# Patient Record
Sex: MALE | Race: WHITE | NOT HISPANIC OR LATINO | URBAN - METROPOLITAN AREA
[De-identification: names, ages, dates, MRNs, and addresses within clinical notes are randomized per-mention and may not be internally consistent; named-entity substitution may affect disease eponyms.]

---

## 2019-07-25 NOTE — PATIENT DISCUSSION
(H25.13) Age-related nuclear cataract, bilateral - Assesment : Examination revealed cataract. Pt is bothered and symptomatic. Pt states only wants to have OD done. - Plan : Recommended cataract extraction to improve visual function. Discussed procedure, risks, and benefits. All questions answered. Pt wishes to proceed. Schedule A-scan and consult.

## 2019-07-25 NOTE — PATIENT DISCUSSION
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 - Plan : Monitor.

## 2019-07-25 NOTE — PATIENT DISCUSSION
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 - Plan : Monitor. Explained increased air exposure to OS and recommended regular use of Gel ATs and kennedi qhs to keep OS lubricated.

## 2019-08-08 NOTE — PATIENT DISCUSSION
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 - Plan : Recommend retinal evaluation prior to considering cataract surgery.

## 2019-08-08 NOTE — PATIENT DISCUSSION
(Z29.152) Retinal artery branch occlusion, left eye - Assesment : Hx of a branch retinal artery occlusion OS. - Plan : Monitor.

## 2019-08-08 NOTE — PATIENT DISCUSSION
(H25.13) Age-related nuclear cataract, bilateral - Assesment : Examination revealed cataract. Pt is bothered and symptomatic. Pt states only wants to have OD done. Recommend retinal clearance prior to cataract surgery to check blood flow OD. - Plan : Risks, Benefits and Alternatives were discussed with patient at length for Cataract Surgery. Visual symptoms are consistent with Cataract findings on examination and current refraction no longer provides satisfactory vision. Discussed with patient limitations with vision that can be associated with poor retinal circulation. Discussed that visual outcome may be limited due to retina. Significant astigmatism OU and recommend Toric. Discussed that if astigmatism is not corrected patient will need correction. Patient understands and desires surgery. All questions answered. Risks, Benefits and Alternatives discussed at length for IOL placement. Patient will need to wear glasses for. EYE: TBD IOL TYPE: TBD POST OPERATIVE TARGET:  RECOMMENDED PACKAGE:  PT PREFERRED PACKAGE:   Patient to see surgery counselor today.  RV 2-3 week follow up after appt with retina

## 2019-08-08 NOTE — PATIENT DISCUSSION
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 Superficial keratitis, Punctate erosion OS. - Plan : Continue gel drops 2-3 times daily OS and gel ointment QHS OS. Recommend Tranquileyes goggles to help keep eye comfortable at night. Monitor for changes. Advised patient to call our office with decreased vision or increased symptoms.

## 2021-08-19 NOTE — PATIENT DISCUSSION
"Pt refused further examination and started to walk out after the OCT. Discussed and emphasized serious condition of likely artery occlusion and that he could permanently lose vision and go blind in both eyes. He states his OS is ""shot"" anyway and ""he has more important issues than his vision right now."" Would not discuss GCA symptoms and states that ""he'll be dead in a couple of months."" I was able to convince him to come see our retinal specialist tomorrow but he refused referral to retinal specialist today as he has ""too much to do"". "

## 2021-08-20 NOTE — PATIENT DISCUSSION
Jon Retinal Artery Occlusion,   Follow up with PCP-check 2D Echo carotid doppler rule out emboli.  No scalp tenderness, no jaw pain, no evidence of GCA.  Patient refuses testing.

## 2021-10-26 ENCOUNTER — NEW PATIENT COMPREHENSIVE (OUTPATIENT)
Dept: URBAN - METROPOLITAN AREA CLINIC 50 | Facility: CLINIC | Age: 86
End: 2021-10-26

## 2021-10-26 DIAGNOSIS — H25.11: ICD-10-CM

## 2021-10-26 DIAGNOSIS — H18.523: ICD-10-CM

## 2021-10-26 PROCEDURE — 92015NC REFRACTION NO CHARGE

## 2021-10-26 PROCEDURE — 92004 COMPRE OPH EXAM NEW PT 1/>: CPT

## 2021-10-26 ASSESSMENT — KERATOMETRY
OS_AXISANGLE2_DEGREES: 57
OS_AXISANGLE_DEGREES: 147
OD_AXISANGLE2_DEGREES: 97
OD_K2POWER_DIOPTERS: 42.00
OS_K2POWER_DIOPTERS: 41.50
OD_K1POWER_DIOPTERS: 40.50
OD_AXISANGLE_DEGREES: 7
OS_K1POWER_DIOPTERS: 40.50

## 2021-10-26 ASSESSMENT — VISUAL ACUITY
OD_CC: 20/70
OS_GLARE: 20/100
OU_CC: J10
OS_SC: 20/100
OD_GLARE: 20/100
OD_SC: 20/100
OS_CC: 20/100
OU_CC: 20/70
OU_SC: 20/100

## 2021-10-26 ASSESSMENT — TONOMETRY
OD_IOP_MMHG: 16
OS_IOP_MMHG: 16